# Patient Record
Sex: FEMALE
[De-identification: names, ages, dates, MRNs, and addresses within clinical notes are randomized per-mention and may not be internally consistent; named-entity substitution may affect disease eponyms.]

---

## 2019-07-08 VITALS — SYSTOLIC BLOOD PRESSURE: 136 MMHG | DIASTOLIC BLOOD PRESSURE: 73 MMHG

## 2019-07-08 LAB
ALBUMIN SERPL-MCNC: 3.6 G/DL (ref 3.5–5)
ALT SERPL-CCNC: 28 U/L (ref 12–78)
APTT PPP: 31 SEC (ref 26.3–35.5)
AST SERPL-CCNC: 18 U/L (ref 10–37)
BASOPHILS NFR BLD AUTO: 1.1 % (ref 0–5)
BILIRUB SERPL-MCNC: 0.2 MG/DL (ref 0.2–1)
BUN SERPL-MCNC: 11 MG/DL (ref 7–18)
CHLORIDE SERPL-SCNC: 103 MMOL/L (ref 101–111)
CO2 SERPL-SCNC: 30 MMOL/L (ref 21–32)
CREAT SERPL-MCNC: 0.9 MG/DL (ref 0.5–1.5)
EOSINOPHIL NFR BLD AUTO: 1.8 % (ref 0–8)
ERYTHROCYTE [DISTWIDTH] IN BLOOD BY AUTOMATED COUNT: 15.2 % (ref 11–15.5)
GFR SERPL CREATININE-BSD FRML MDRD: 78 ML/MIN (ref 60–?)
GLUCOSE SERPL-MCNC: 101 MG/DL (ref 70–105)
HCT VFR BLD AUTO: 41.4 % (ref 36–48)
INR PPP: 0.92 (ref 0.85–1.15)
LYMPHOCYTES NFR SPEC AUTO: 29.1 % (ref 21–51)
MCH RBC QN AUTO: 27.8 PG (ref 27–33)
MCHC RBC AUTO-ENTMCNC: 32.9 G/DL (ref 32–36)
MCV RBC AUTO: 84.7 FL (ref 79–99)
MONOCYTES NFR BLD AUTO: 6.2 % (ref 3–13)
NEUTROPHILS NFR BLD AUTO: 61.8 % (ref 40–77)
NRBC BLD MANUAL-RTO: 0 % (ref 0–0.19)
PH UR STRIP: 6 [PH] (ref 5–8)
PLATELET # BLD AUTO: 322 K/UL (ref 130–400)
POTASSIUM SERPL-SCNC: 3.9 MMOL/L (ref 3.5–5.1)
PROT SERPL-MCNC: 7.9 G/DL (ref 6–8.3)
PROTHROMBIN TIME: 9.7 SEC (ref 9.6–11.6)
RBC # BLD AUTO: 4.88 MIL/UL (ref 4–5.5)
RBC #/AREA URNS HPF: (no result) /HPF (ref 0–1)
SODIUM SERPL-SCNC: 140 MMOL/L (ref 136–145)
SP GR UR STRIP: 1.02 (ref 1–1.03)
UROBILINOGEN UR STRIP-MCNC: 1 MG/DL (ref 0.2–1)
WBC # BLD AUTO: 8.8 K/UL (ref 4.8–10.8)
WBC #/AREA URNS HPF: (no result) /HPF (ref 0–1)

## 2019-07-09 ENCOUNTER — HOSPITAL ENCOUNTER (OUTPATIENT)
Dept: HOSPITAL 90 - DAH | Age: 29
Discharge: HOME | End: 2019-07-09
Payer: MEDICAID

## 2019-07-09 VITALS — BODY MASS INDEX: 55.32 KG/M2 | HEIGHT: 61 IN | WEIGHT: 293 LBS

## 2019-07-09 VITALS — SYSTOLIC BLOOD PRESSURE: 98 MMHG | DIASTOLIC BLOOD PRESSURE: 49 MMHG

## 2019-07-09 VITALS — DIASTOLIC BLOOD PRESSURE: 68 MMHG | SYSTOLIC BLOOD PRESSURE: 120 MMHG

## 2019-07-09 VITALS — SYSTOLIC BLOOD PRESSURE: 101 MMHG | DIASTOLIC BLOOD PRESSURE: 64 MMHG

## 2019-07-09 VITALS — SYSTOLIC BLOOD PRESSURE: 150 MMHG | DIASTOLIC BLOOD PRESSURE: 92 MMHG

## 2019-07-09 VITALS — SYSTOLIC BLOOD PRESSURE: 122 MMHG | DIASTOLIC BLOOD PRESSURE: 70 MMHG

## 2019-07-09 VITALS — DIASTOLIC BLOOD PRESSURE: 57 MMHG | SYSTOLIC BLOOD PRESSURE: 106 MMHG

## 2019-07-09 VITALS — SYSTOLIC BLOOD PRESSURE: 117 MMHG | DIASTOLIC BLOOD PRESSURE: 66 MMHG

## 2019-07-09 VITALS — SYSTOLIC BLOOD PRESSURE: 89 MMHG | DIASTOLIC BLOOD PRESSURE: 49 MMHG

## 2019-07-09 VITALS — SYSTOLIC BLOOD PRESSURE: 107 MMHG | DIASTOLIC BLOOD PRESSURE: 71 MMHG

## 2019-07-09 VITALS — DIASTOLIC BLOOD PRESSURE: 66 MMHG | SYSTOLIC BLOOD PRESSURE: 120 MMHG

## 2019-07-09 VITALS — SYSTOLIC BLOOD PRESSURE: 118 MMHG | DIASTOLIC BLOOD PRESSURE: 74 MMHG

## 2019-07-09 VITALS — SYSTOLIC BLOOD PRESSURE: 121 MMHG | DIASTOLIC BLOOD PRESSURE: 62 MMHG

## 2019-07-09 VITALS — SYSTOLIC BLOOD PRESSURE: 110 MMHG | DIASTOLIC BLOOD PRESSURE: 51 MMHG

## 2019-07-09 DIAGNOSIS — G56.02: ICD-10-CM

## 2019-07-09 DIAGNOSIS — K21.9: ICD-10-CM

## 2019-07-09 DIAGNOSIS — N92.6: ICD-10-CM

## 2019-07-09 DIAGNOSIS — Z79.899: ICD-10-CM

## 2019-07-09 DIAGNOSIS — Z79.01: ICD-10-CM

## 2019-07-09 DIAGNOSIS — Z90.49: ICD-10-CM

## 2019-07-09 DIAGNOSIS — M65.9: Primary | ICD-10-CM

## 2019-07-09 DIAGNOSIS — E66.01: ICD-10-CM

## 2019-07-09 PROCEDURE — 80053 COMPREHEN METABOLIC PANEL: CPT

## 2019-07-09 PROCEDURE — 64721 CARPAL TUNNEL SURGERY: CPT

## 2019-07-09 PROCEDURE — 81001 URINALYSIS AUTO W/SCOPE: CPT

## 2019-07-09 PROCEDURE — 85025 COMPLETE CBC W/AUTO DIFF WBC: CPT

## 2019-07-09 PROCEDURE — 84703 CHORIONIC GONADOTROPIN ASSAY: CPT

## 2019-07-09 PROCEDURE — 88304 TISSUE EXAM BY PATHOLOGIST: CPT

## 2019-07-09 PROCEDURE — 93005 ELECTROCARDIOGRAM TRACING: CPT

## 2019-07-09 PROCEDURE — 71045 X-RAY EXAM CHEST 1 VIEW: CPT

## 2019-07-09 PROCEDURE — 85730 THROMBOPLASTIN TIME PARTIAL: CPT

## 2019-07-09 PROCEDURE — 85610 PROTHROMBIN TIME: CPT

## 2019-07-09 PROCEDURE — 36415 COLL VENOUS BLD VENIPUNCTURE: CPT

## 2019-07-09 PROCEDURE — 25115 REMOVE WRIST/FOREARM LESION: CPT

## 2019-07-09 RX ADMIN — SODIUM CHLORIDE ONE GM: 9 INJECTION, SOLUTION INTRAVENOUS at 09:20

## 2019-07-09 RX ADMIN — SODIUM CHLORIDE ONE GM: 9 INJECTION, SOLUTION INTRAVENOUS at 10:54

## 2019-07-09 NOTE — NUR
POST OP

RECEIVED PT FROM PACU, S/P LEFT CARPAL TUNNEL RELEASE SX. BULKY DRESSING TO LEFT ARM , 
NEUROVASCULAR CHECKS TO LEFT HAND  WNL. PT AWAKE AND ALERT, NO DISTRESS NOTED. DENIED ANY 
PAIN OR DISCOMFORTS. ICE PACKS TO AREA. VS STABLE ON ARRIVAL. PT INSTRUCTED TO KEEP LEFT ARM 
ELEVATED ABOVE HEART LEVEL AT ALL TIMES. FAMILY AT BEDSIDE.

## 2019-07-09 NOTE — NUR
POTENTIAL FOR INFECTION:

WIPED LEFT HAND / LOWER ARM PER MEENA MOELLER MA WITH AZIZA: 2% CHLORHEXIDINE GLUCONATE CLOTH 
PATIENTS PRE-OP SKIN PREP.

## 2019-07-09 NOTE — NUR
dc

dc instructions given and reviewed with pt's sister in law. instructed to f/u with dr. topete. photo copy of orders provided to patient/family. left arm dressing dry and intact, 
neurovascular checks wnl. pt denies any pain or discomforts.

## 2019-08-28 VITALS — DIASTOLIC BLOOD PRESSURE: 83 MMHG | SYSTOLIC BLOOD PRESSURE: 147 MMHG

## 2019-08-29 ENCOUNTER — HOSPITAL ENCOUNTER (OUTPATIENT)
Dept: HOSPITAL 90 - DAH | Age: 29
Discharge: HOME | End: 2019-08-29
Payer: MEDICAID

## 2019-08-29 VITALS — SYSTOLIC BLOOD PRESSURE: 117 MMHG | DIASTOLIC BLOOD PRESSURE: 68 MMHG

## 2019-08-29 VITALS — DIASTOLIC BLOOD PRESSURE: 81 MMHG | SYSTOLIC BLOOD PRESSURE: 142 MMHG

## 2019-08-29 VITALS — SYSTOLIC BLOOD PRESSURE: 117 MMHG | DIASTOLIC BLOOD PRESSURE: 72 MMHG

## 2019-08-29 VITALS — DIASTOLIC BLOOD PRESSURE: 73 MMHG | SYSTOLIC BLOOD PRESSURE: 116 MMHG

## 2019-08-29 VITALS — DIASTOLIC BLOOD PRESSURE: 67 MMHG | SYSTOLIC BLOOD PRESSURE: 118 MMHG

## 2019-08-29 VITALS — DIASTOLIC BLOOD PRESSURE: 62 MMHG | SYSTOLIC BLOOD PRESSURE: 120 MMHG

## 2019-08-29 VITALS — SYSTOLIC BLOOD PRESSURE: 118 MMHG | DIASTOLIC BLOOD PRESSURE: 68 MMHG

## 2019-08-29 VITALS — WEIGHT: 293 LBS | HEIGHT: 61.5 IN | BODY MASS INDEX: 54.61 KG/M2

## 2019-08-29 VITALS — DIASTOLIC BLOOD PRESSURE: 62 MMHG | SYSTOLIC BLOOD PRESSURE: 117 MMHG

## 2019-08-29 VITALS — SYSTOLIC BLOOD PRESSURE: 110 MMHG | DIASTOLIC BLOOD PRESSURE: 64 MMHG

## 2019-08-29 VITALS — DIASTOLIC BLOOD PRESSURE: 65 MMHG | SYSTOLIC BLOOD PRESSURE: 116 MMHG

## 2019-08-29 VITALS — DIASTOLIC BLOOD PRESSURE: 67 MMHG | SYSTOLIC BLOOD PRESSURE: 125 MMHG

## 2019-08-29 VITALS — SYSTOLIC BLOOD PRESSURE: 114 MMHG | DIASTOLIC BLOOD PRESSURE: 60 MMHG

## 2019-08-29 VITALS — DIASTOLIC BLOOD PRESSURE: 58 MMHG | SYSTOLIC BLOOD PRESSURE: 122 MMHG

## 2019-08-29 VITALS — DIASTOLIC BLOOD PRESSURE: 80 MMHG | SYSTOLIC BLOOD PRESSURE: 113 MMHG

## 2019-08-29 VITALS — SYSTOLIC BLOOD PRESSURE: 116 MMHG | DIASTOLIC BLOOD PRESSURE: 74 MMHG

## 2019-08-29 VITALS — SYSTOLIC BLOOD PRESSURE: 124 MMHG | DIASTOLIC BLOOD PRESSURE: 70 MMHG

## 2019-08-29 VITALS — SYSTOLIC BLOOD PRESSURE: 125 MMHG | DIASTOLIC BLOOD PRESSURE: 60 MMHG

## 2019-08-29 DIAGNOSIS — Z79.899: ICD-10-CM

## 2019-08-29 DIAGNOSIS — M65.831: Primary | ICD-10-CM

## 2019-08-29 DIAGNOSIS — K21.9: ICD-10-CM

## 2019-08-29 DIAGNOSIS — Z82.49: ICD-10-CM

## 2019-08-29 DIAGNOSIS — Z90.49: ICD-10-CM

## 2019-08-29 DIAGNOSIS — Z98.890: ICD-10-CM

## 2019-08-29 DIAGNOSIS — Z82.3: ICD-10-CM

## 2019-08-29 DIAGNOSIS — E66.01: ICD-10-CM

## 2019-08-29 DIAGNOSIS — G56.01: ICD-10-CM

## 2019-08-29 PROCEDURE — 64721 CARPAL TUNNEL SURGERY: CPT

## 2019-08-29 PROCEDURE — 88305 TISSUE EXAM BY PATHOLOGIST: CPT

## 2019-08-29 PROCEDURE — 93005 ELECTROCARDIOGRAM TRACING: CPT

## 2019-08-29 PROCEDURE — 71045 X-RAY EXAM CHEST 1 VIEW: CPT

## 2019-08-29 PROCEDURE — 81025 URINE PREGNANCY TEST: CPT

## 2019-08-29 PROCEDURE — 25115 REMOVE WRIST/FOREARM LESION: CPT

## 2019-08-29 RX ADMIN — SODIUM CHLORIDE ONE GM: 9 INJECTION, SOLUTION INTRAVENOUS at 07:30

## 2019-08-29 RX ADMIN — SODIUM CHLORIDE ONE GM: 9 INJECTION, SOLUTION INTRAVENOUS at 07:24

## 2019-08-29 NOTE — NUR
PT'S PAIN LEVEL 3/10.  TOLERABLE.  CAP REFILL TO RT HAND LESS THAN THREE SECONDS, FINGER 
WARM TO TACTILE, O2 SATS ON RT HAND 97%.  PT STABLE.  DR. ESCOBAR TO SEE PT IN DAY PT, RM 
4.  

-------------------------------------------------------------------------------

Addendum: 08/29/19 at 1025 by VANCE MEDELLIN RN RN

-------------------------------------------------------------------------------

Amended: Links added.

## 2019-08-29 NOTE — NUR
NEURO ASSESSMENTS DONE WITH FLAKO CARTER, DAY PT RM 4.  PT HAS CAP REFILL UNDER 3 SECONDS, PT 
ABLE TO WIGGLE FINGERS, FINGERS WARM TO TACTILE, AND O2 SATS 98 PERCENT ON RT HAND.  THIS 
INFO GIVEN TO DR. ESCOBAR.  FLAKO CARTER INSTRUCTED TO DO NEURO CHECKS ON OPERATIVE HAND.  

-------------------------------------------------------------------------------

Addendum: 08/29/19 at 1032 by VANCE MEDELLIN RN RN

-------------------------------------------------------------------------------

Amended: Links added.

## 2019-08-29 NOTE — NUR
neuro check done prior to d/c, capillary refill less then 3 sec. pt. able to wiggle fingers, 
O2 stats at 98% taken on the right hand. 

pt. left with copy of  instruction and f/u appt for [place] at 10:45 am. pt. given d/c 
instructions as per MD. 

pt. left in stable condition via wheel chair in pvt car with daughter in law.

## 2019-08-29 NOTE — NUR
DR. HALL NOTIFIED PT IN PAIN 6/10, RIGHT WRIST.  DR. HALL WANTS O2 SATS READINGS ON RT 
HAND.  RT HAND SATS 98%, WITH GOOD CAPILLARY REFILL.  DR. ESCOBAR PRESENT TO ASSESS PT.  RT 
HAND ASSESSMENT WNL, DR. HALL ORDERED TORADOL 30MG IV, AND DR. ESCOBAR AGREED.  

-------------------------------------------------------------------------------

Addendum: 08/29/19 at 1010 by VANCE MEDELLIN RN RN

-------------------------------------------------------------------------------

Amended: Links added.

## 2019-08-29 NOTE — NUR
POTENTIAL FOR INFECTION:

SHAVED RIGHT HAND / LOWER ARM PER JADEN MARIE, FOLLOWED BY WIPING WITH AZIZA: 2% 
CHLORHEXIDINE GLUCONATE CLOTH PATIENTS PRE-OP SKIN PREP.

## 2024-10-02 ENCOUNTER — HOSPITAL ENCOUNTER (EMERGENCY)
Dept: HOSPITAL 90 - EDH | Age: 34
Discharge: HOME | End: 2024-10-02
Payer: COMMERCIAL

## 2024-10-02 VITALS — DIASTOLIC BLOOD PRESSURE: 79 MMHG | SYSTOLIC BLOOD PRESSURE: 150 MMHG | OXYGEN SATURATION: 99 % | TEMPERATURE: 98.2 F

## 2024-10-02 VITALS — RESPIRATION RATE: 20 BRPM | HEART RATE: 91 BPM

## 2024-10-02 VITALS — BODY MASS INDEX: 55.32 KG/M2 | HEIGHT: 61 IN | WEIGHT: 293 LBS

## 2024-10-02 DIAGNOSIS — E66.01: ICD-10-CM
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
## 2024-10-02 DIAGNOSIS — J45.909: ICD-10-CM

## 2024-10-02 DIAGNOSIS — Z79.899: ICD-10-CM
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
## 2024-10-02 DIAGNOSIS — I10: ICD-10-CM
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
## 2024-10-02 DIAGNOSIS — T63.301A: Primary | ICD-10-CM

## 2024-10-02 PROCEDURE — 94640 AIRWAY INHALATION TREATMENT: CPT

## 2024-10-02 RX ADMIN — ALBUTEROL SULFATE ONE MG: 2.5 SOLUTION RESPIRATORY (INHALATION) at 11:22

## 2024-10-02 RX ADMIN — FAMOTIDINE ONE MG: 20 TABLET, FILM COATED ORAL at 11:32
